# Patient Record
Sex: FEMALE | Race: WHITE | Employment: PART TIME | URBAN - METROPOLITAN AREA
[De-identification: names, ages, dates, MRNs, and addresses within clinical notes are randomized per-mention and may not be internally consistent; named-entity substitution may affect disease eponyms.]

---

## 2017-01-09 PROBLEM — K21.9 GASTROESOPHAGEAL REFLUX DISEASE WITHOUT ESOPHAGITIS: Status: ACTIVE | Noted: 2017-01-09

## 2017-01-11 PROCEDURE — 80061 LIPID PANEL: CPT | Performed by: INTERNAL MEDICINE

## 2017-01-11 PROCEDURE — 80053 COMPREHEN METABOLIC PANEL: CPT | Performed by: INTERNAL MEDICINE

## 2017-01-11 PROCEDURE — 36415 COLL VENOUS BLD VENIPUNCTURE: CPT | Performed by: INTERNAL MEDICINE

## 2017-01-11 PROCEDURE — 82306 VITAMIN D 25 HYDROXY: CPT | Performed by: INTERNAL MEDICINE

## 2018-01-10 PROBLEM — Z13.29 SCREENING FOR THYROID DISORDER: Status: ACTIVE | Noted: 2018-01-10

## 2018-01-10 PROBLEM — Z13.29 SCREENING FOR ENDOCRINE DISORDER: Status: ACTIVE | Noted: 2018-01-10

## 2018-12-19 PROCEDURE — 81003 URINALYSIS AUTO W/O SCOPE: CPT | Performed by: PHYSICIAN ASSISTANT

## 2018-12-31 PROCEDURE — 81003 URINALYSIS AUTO W/O SCOPE: CPT | Performed by: PHYSICIAN ASSISTANT

## 2019-01-16 PROCEDURE — 87624 HPV HI-RISK TYP POOLED RSLT: CPT | Performed by: INTERNAL MEDICINE

## 2019-01-16 PROCEDURE — 88175 CYTOPATH C/V AUTO FLUID REDO: CPT | Performed by: INTERNAL MEDICINE

## 2019-01-18 PROCEDURE — 86803 HEPATITIS C AB TEST: CPT | Performed by: INTERNAL MEDICINE

## 2020-01-22 PROBLEM — Z13.29 SCREENING FOR THYROID DISORDER: Status: RESOLVED | Noted: 2018-01-10 | Resolved: 2020-01-22

## 2020-02-07 PROBLEM — M25.511 ACUTE PAIN OF RIGHT SHOULDER: Status: ACTIVE | Noted: 2020-02-07

## 2022-02-17 PROBLEM — E78.5 DYSLIPIDEMIA: Status: ACTIVE | Noted: 2022-02-17

## 2022-10-20 ENCOUNTER — HOSPITAL ENCOUNTER (OUTPATIENT)
Dept: GENERAL RADIOLOGY | Age: 62
Discharge: HOME | End: 2022-10-20
Attending: PHYSICIAN ASSISTANT
Payer: COMMERCIAL

## 2022-10-20 ENCOUNTER — OFFICE VISIT (OUTPATIENT)
Dept: INTERNAL MEDICINE CLINIC | Facility: CLINIC | Age: 62
End: 2022-10-20
Payer: COMMERCIAL

## 2022-10-20 VITALS
OXYGEN SATURATION: 97 % | WEIGHT: 132.63 LBS | HEIGHT: 60.63 IN | TEMPERATURE: 98 F | HEART RATE: 88 BPM | BODY MASS INDEX: 25.37 KG/M2 | SYSTOLIC BLOOD PRESSURE: 160 MMHG | RESPIRATION RATE: 18 BRPM | DIASTOLIC BLOOD PRESSURE: 78 MMHG

## 2022-10-20 DIAGNOSIS — E78.00 PURE HYPERCHOLESTEROLEMIA: ICD-10-CM

## 2022-10-20 DIAGNOSIS — M85.80 OSTEOPENIA, UNSPECIFIED LOCATION: ICD-10-CM

## 2022-10-20 DIAGNOSIS — Z13.29 SCREENING FOR THYROID DISORDER: ICD-10-CM

## 2022-10-20 DIAGNOSIS — M79.671 RIGHT FOOT PAIN: Primary | ICD-10-CM

## 2022-10-20 DIAGNOSIS — Z13.0 SCREENING, ANEMIA, DEFICIENCY, IRON: ICD-10-CM

## 2022-10-20 DIAGNOSIS — M79.671 RIGHT FOOT PAIN: ICD-10-CM

## 2022-10-20 DIAGNOSIS — I10 PRIMARY HYPERTENSION: ICD-10-CM

## 2022-10-20 PROCEDURE — 3077F SYST BP >= 140 MM HG: CPT | Performed by: PHYSICIAN ASSISTANT

## 2022-10-20 PROCEDURE — 3078F DIAST BP <80 MM HG: CPT | Performed by: PHYSICIAN ASSISTANT

## 2022-10-20 PROCEDURE — 99203 OFFICE O/P NEW LOW 30 MIN: CPT | Performed by: PHYSICIAN ASSISTANT

## 2022-10-20 PROCEDURE — 73630 X-RAY EXAM OF FOOT: CPT | Performed by: PHYSICIAN ASSISTANT

## 2022-10-20 PROCEDURE — 3008F BODY MASS INDEX DOCD: CPT | Performed by: PHYSICIAN ASSISTANT

## 2022-10-20 RX ORDER — LOSARTAN POTASSIUM 25 MG/1
25 TABLET ORAL DAILY
Qty: 30 TABLET | Refills: 1 | Status: SHIPPED | OUTPATIENT
Start: 2022-10-20

## 2022-10-27 ENCOUNTER — TELEPHONE (OUTPATIENT)
Dept: INTERNAL MEDICINE CLINIC | Facility: CLINIC | Age: 62
End: 2022-10-27

## 2022-10-27 NOTE — TELEPHONE ENCOUNTER
cpe   Future Appointments   Date Time Provider Lopez Quiñonez   3/13/2023  3:20 PM Mina Bennett., TIMOTHY [facility]      Orders to THE [facility]   aware must fast no call back required

## 2022-12-01 ENCOUNTER — OFFICE VISIT (OUTPATIENT)
Dept: INTERNAL MEDICINE CLINIC | Facility: CLINIC | Age: 62
End: 2022-12-01
Payer: COMMERCIAL

## 2022-12-01 VITALS
SYSTOLIC BLOOD PRESSURE: 132 MMHG | BODY MASS INDEX: 25.24 KG/M2 | WEIGHT: 132 LBS | HEIGHT: 60.63 IN | OXYGEN SATURATION: 98 % | RESPIRATION RATE: 16 BRPM | HEART RATE: 78 BPM | DIASTOLIC BLOOD PRESSURE: 70 MMHG

## 2022-12-01 DIAGNOSIS — I10 PRIMARY HYPERTENSION: ICD-10-CM

## 2022-12-01 DIAGNOSIS — M79.671 RIGHT FOOT PAIN: Primary | ICD-10-CM

## 2022-12-01 PROCEDURE — 3078F DIAST BP <80 MM HG: CPT | Performed by: PHYSICIAN ASSISTANT

## 2022-12-01 PROCEDURE — 3075F SYST BP GE 130 - 139MM HG: CPT | Performed by: PHYSICIAN ASSISTANT

## 2022-12-01 PROCEDURE — 99213 OFFICE O/P EST LOW 20 MIN: CPT | Performed by: PHYSICIAN ASSISTANT

## 2022-12-01 PROCEDURE — 3008F BODY MASS INDEX DOCD: CPT | Performed by: PHYSICIAN ASSISTANT

## 2022-12-01 RX ORDER — LOSARTAN POTASSIUM 25 MG/1
25 TABLET ORAL DAILY
Qty: 90 TABLET | Refills: 3 | Status: SHIPPED | OUTPATIENT
Start: 2022-12-01

## 2022-12-02 ENCOUNTER — LAB ENCOUNTER (OUTPATIENT)
Dept: LAB | Age: 62
End: 2022-12-02
Attending: PHYSICIAN ASSISTANT
Payer: COMMERCIAL

## 2022-12-02 ENCOUNTER — TELEPHONE (OUTPATIENT)
Dept: ORTHOPEDICS CLINIC | Facility: CLINIC | Age: 62
End: 2022-12-02

## 2022-12-02 DIAGNOSIS — M79.671 RIGHT FOOT PAIN: Primary | ICD-10-CM

## 2022-12-02 DIAGNOSIS — I10 PRIMARY HYPERTENSION: ICD-10-CM

## 2022-12-02 LAB
ANION GAP SERPL CALC-SCNC: 5 MMOL/L (ref 0–18)
BUN BLD-MCNC: 16 MG/DL (ref 7–18)
BUN/CREAT SERPL: 19.5 (ref 10–20)
CALCIUM BLD-MCNC: 9.7 MG/DL (ref 8.5–10.1)
CHLORIDE SERPL-SCNC: 106 MMOL/L (ref 98–112)
CO2 SERPL-SCNC: 27 MMOL/L (ref 21–32)
CREAT BLD-MCNC: 0.82 MG/DL
FASTING STATUS PATIENT QL REPORTED: NO
GFR SERPLBLD BASED ON 1.73 SQ M-ARVRAT: 81 ML/MIN/1.73M2 (ref 60–?)
GLUCOSE BLD-MCNC: 140 MG/DL (ref 70–99)
OSMOLALITY SERPL CALC.SUM OF ELEC: 289 MOSM/KG (ref 275–295)
POTASSIUM SERPL-SCNC: 5.1 MMOL/L (ref 3.5–5.1)
SODIUM SERPL-SCNC: 138 MMOL/L (ref 136–145)

## 2022-12-02 PROCEDURE — 80048 BASIC METABOLIC PNL TOTAL CA: CPT

## 2022-12-02 PROCEDURE — 36415 COLL VENOUS BLD VENIPUNCTURE: CPT

## 2023-01-11 ENCOUNTER — HOSPITAL ENCOUNTER (OUTPATIENT)
Dept: GENERAL RADIOLOGY | Age: 63
Discharge: HOME | End: 2023-01-11
Attending: PODIATRIST
Payer: COMMERCIAL

## 2023-01-11 ENCOUNTER — OFFICE VISIT (OUTPATIENT)
Dept: ORTHOPEDICS CLINIC | Facility: CLINIC | Age: 63
End: 2023-01-11
Payer: COMMERCIAL

## 2023-01-11 VITALS — HEIGHT: 60.63 IN | WEIGHT: 130 LBS | BODY MASS INDEX: 24.87 KG/M2

## 2023-01-11 DIAGNOSIS — M21.619 BUNION: ICD-10-CM

## 2023-01-11 DIAGNOSIS — M79.671 RIGHT FOOT PAIN: ICD-10-CM

## 2023-01-11 DIAGNOSIS — L90.9 FAT PAD ATROPHY OF FOOT: ICD-10-CM

## 2023-01-11 DIAGNOSIS — M79.671 RIGHT FOOT PAIN: Primary | ICD-10-CM

## 2023-01-11 PROCEDURE — 73630 X-RAY EXAM OF FOOT: CPT | Performed by: PODIATRIST

## 2023-01-11 PROCEDURE — 99203 OFFICE O/P NEW LOW 30 MIN: CPT | Performed by: PODIATRIST

## 2023-01-11 PROCEDURE — 3008F BODY MASS INDEX DOCD: CPT | Performed by: PODIATRIST

## 2023-03-04 ENCOUNTER — LABORATORY ENCOUNTER (OUTPATIENT)
Dept: LAB | Age: 63
End: 2023-03-04
Attending: PHYSICIAN ASSISTANT
Payer: COMMERCIAL

## 2023-03-04 DIAGNOSIS — Z13.29 SCREENING FOR THYROID DISORDER: ICD-10-CM

## 2023-03-04 LAB
ALBUMIN SERPL-MCNC: 3.8 G/DL (ref 3.4–5)
ALBUMIN/GLOB SERPL: 1.2 {RATIO} (ref 1–2)
ALP LIVER SERPL-CCNC: 76 U/L
ALT SERPL-CCNC: 31 U/L
ANION GAP SERPL CALC-SCNC: 5 MMOL/L (ref 0–18)
AST SERPL-CCNC: 28 U/L (ref 15–37)
BASOPHILS # BLD AUTO: 0.07 X10(3) UL (ref 0–0.2)
BASOPHILS NFR BLD AUTO: 1.4 %
BILIRUB SERPL-MCNC: 0.4 MG/DL (ref 0.1–2)
BUN BLD-MCNC: 13 MG/DL (ref 7–18)
CALCIUM BLD-MCNC: 9.2 MG/DL (ref 8.5–10.1)
CHLORIDE SERPL-SCNC: 108 MMOL/L (ref 98–112)
CHOLEST SERPL-MCNC: 161 MG/DL (ref ?–200)
CO2 SERPL-SCNC: 25 MMOL/L (ref 21–32)
CREAT BLD-MCNC: 0.66 MG/DL
EOSINOPHIL # BLD AUTO: 0.11 X10(3) UL (ref 0–0.7)
EOSINOPHIL NFR BLD AUTO: 2.2 %
ERYTHROCYTE [DISTWIDTH] IN BLOOD BY AUTOMATED COUNT: 11.7 %
FASTING PATIENT LIPID ANSWER: YES
FASTING STATUS PATIENT QL REPORTED: YES
GFR SERPLBLD BASED ON 1.73 SQ M-ARVRAT: 99 ML/MIN/1.73M2 (ref 60–?)
GLOBULIN PLAS-MCNC: 3.3 G/DL (ref 2.8–4.4)
GLUCOSE BLD-MCNC: 104 MG/DL (ref 70–99)
HCT VFR BLD AUTO: 44.1 %
HDLC SERPL-MCNC: 76 MG/DL (ref 40–59)
HGB BLD-MCNC: 14.9 G/DL
IMM GRANULOCYTES # BLD AUTO: 0.01 X10(3) UL (ref 0–1)
IMM GRANULOCYTES NFR BLD: 0.2 %
LDLC SERPL CALC-MCNC: 76 MG/DL (ref ?–100)
LYMPHOCYTES # BLD AUTO: 1.53 X10(3) UL (ref 1–4)
LYMPHOCYTES NFR BLD AUTO: 30.7 %
MCH RBC QN AUTO: 30.3 PG (ref 26–34)
MCHC RBC AUTO-ENTMCNC: 33.8 G/DL (ref 31–37)
MCV RBC AUTO: 89.6 FL
MONOCYTES # BLD AUTO: 0.54 X10(3) UL (ref 0.1–1)
MONOCYTES NFR BLD AUTO: 10.8 %
NEUTROPHILS # BLD AUTO: 2.72 X10 (3) UL (ref 1.5–7.7)
NEUTROPHILS # BLD AUTO: 2.72 X10(3) UL (ref 1.5–7.7)
NEUTROPHILS NFR BLD AUTO: 54.7 %
NONHDLC SERPL-MCNC: 85 MG/DL (ref ?–130)
OSMOLALITY SERPL CALC.SUM OF ELEC: 286 MOSM/KG (ref 275–295)
PLATELET # BLD AUTO: 218 10(3)UL (ref 150–450)
POTASSIUM SERPL-SCNC: 4.9 MMOL/L (ref 3.5–5.1)
PROT SERPL-MCNC: 7.1 G/DL (ref 6.4–8.2)
RBC # BLD AUTO: 4.92 X10(6)UL
SODIUM SERPL-SCNC: 138 MMOL/L (ref 136–145)
TRIGL SERPL-MCNC: 38 MG/DL (ref 30–149)
TSI SER-ACNC: 1.19 MIU/ML (ref 0.36–3.74)
VLDLC SERPL CALC-MCNC: 6 MG/DL (ref 0–30)
WBC # BLD AUTO: 5 X10(3) UL (ref 4–11)

## 2023-03-04 PROCEDURE — 80061 LIPID PANEL: CPT | Performed by: PHYSICIAN ASSISTANT

## 2023-03-04 PROCEDURE — 80050 GENERAL HEALTH PANEL: CPT | Performed by: PHYSICIAN ASSISTANT

## 2023-03-13 ENCOUNTER — OFFICE VISIT (OUTPATIENT)
Dept: INTERNAL MEDICINE CLINIC | Facility: CLINIC | Age: 63
End: 2023-03-13
Payer: COMMERCIAL

## 2023-03-13 ENCOUNTER — TELEPHONE (OUTPATIENT)
Dept: INTERNAL MEDICINE CLINIC | Facility: CLINIC | Age: 63
End: 2023-03-13

## 2023-03-13 VITALS
HEIGHT: 60.75 IN | RESPIRATION RATE: 16 BRPM | SYSTOLIC BLOOD PRESSURE: 130 MMHG | DIASTOLIC BLOOD PRESSURE: 72 MMHG | WEIGHT: 130.38 LBS | TEMPERATURE: 97 F | HEART RATE: 88 BPM | BODY MASS INDEX: 24.94 KG/M2

## 2023-03-13 DIAGNOSIS — J45.20 MILD INTERMITTENT ASTHMA WITHOUT COMPLICATION: ICD-10-CM

## 2023-03-13 DIAGNOSIS — E78.5 DYSLIPIDEMIA: ICD-10-CM

## 2023-03-13 DIAGNOSIS — K21.9 GASTROESOPHAGEAL REFLUX DISEASE WITHOUT ESOPHAGITIS: ICD-10-CM

## 2023-03-13 DIAGNOSIS — Z12.31 ENCOUNTER FOR SCREENING MAMMOGRAM FOR MALIGNANT NEOPLASM OF BREAST: ICD-10-CM

## 2023-03-13 DIAGNOSIS — M85.80 OSTEOPENIA, UNSPECIFIED LOCATION: ICD-10-CM

## 2023-03-13 DIAGNOSIS — I10 PRIMARY HYPERTENSION: ICD-10-CM

## 2023-03-13 DIAGNOSIS — R73.01 IFG (IMPAIRED FASTING GLUCOSE): ICD-10-CM

## 2023-03-13 DIAGNOSIS — Z00.00 ROUTINE GENERAL MEDICAL EXAMINATION AT A HEALTH CARE FACILITY: Primary | ICD-10-CM

## 2023-03-13 DIAGNOSIS — Z12.83 SCREENING EXAM FOR SKIN CANCER: ICD-10-CM

## 2023-03-13 LAB
CARTRIDGE LOT#: 30 NUMERIC
HEMOGLOBIN A1C: 5.5 % (ref 4.3–5.6)

## 2023-03-13 RX ORDER — ALBUTEROL SULFATE 90 UG/1
2 AEROSOL, METERED RESPIRATORY (INHALATION) EVERY 6 HOURS PRN
Qty: 1 EACH | Refills: 1 | Status: SHIPPED | OUTPATIENT
Start: 2023-03-13 | End: 2024-03-12

## 2023-03-13 NOTE — TELEPHONE ENCOUNTER
Please place orders for   Pneumonia shot  Future Appointments   Date Time Provider Lopez Quiñonez   3/17/2023  2:30 PM EMG 35 NURSE [facility]

## 2023-03-17 ENCOUNTER — NURSE ONLY (OUTPATIENT)
Dept: INTERNAL MEDICINE CLINIC | Facility: CLINIC | Age: 63
End: 2023-03-17
Payer: COMMERCIAL

## 2023-03-20 RX ORDER — SOLIFENACIN SUCCINATE 5 MG/1
5 TABLET, FILM COATED ORAL DAILY
Qty: 90 TABLET | Refills: 3 | Status: SHIPPED | OUTPATIENT
Start: 2023-03-20

## 2023-04-04 ENCOUNTER — TELEPHONE (OUTPATIENT)
Dept: RADIATION ONCOLOGY | Facility: HOSPITAL | Age: 63
End: 2023-04-04

## 2023-04-04 NOTE — TELEPHONE ENCOUNTER
Unable to lvm to [facility] consult .  Ref dr Jessie Mims

## 2023-04-05 ENCOUNTER — TELEPHONE (OUTPATIENT)
Dept: RADIATION ONCOLOGY | Facility: HOSPITAL | Age: 63
End: 2023-04-05

## 2023-04-06 ENCOUNTER — TELEPHONE (OUTPATIENT)
Dept: RADIATION ONCOLOGY | Facility: HOSPITAL | Age: 63
End: 2023-04-06

## 2023-10-02 ENCOUNTER — HOSPITAL ENCOUNTER (OUTPATIENT)
Dept: MAMMOGRAPHY | Age: 63
Discharge: HOME | End: 2023-10-02
Attending: PHYSICIAN ASSISTANT
Payer: COMMERCIAL

## 2023-10-02 DIAGNOSIS — Z12.31 ENCOUNTER FOR SCREENING MAMMOGRAM FOR MALIGNANT NEOPLASM OF BREAST: ICD-10-CM

## 2023-10-02 PROCEDURE — 77067 SCR MAMMO BI INCL CAD: CPT | Performed by: PHYSICIAN ASSISTANT

## 2023-10-02 PROCEDURE — 77063 BREAST TOMOSYNTHESIS BI: CPT | Performed by: PHYSICIAN ASSISTANT

## 2023-12-21 ENCOUNTER — TELEPHONE (OUTPATIENT)
Dept: INTERNAL MEDICINE CLINIC | Facility: CLINIC | Age: 63
End: 2023-12-21

## 2023-12-21 ENCOUNTER — OFFICE VISIT (OUTPATIENT)
Dept: INTERNAL MEDICINE CLINIC | Facility: CLINIC | Age: 63
End: 2023-12-21
Payer: COMMERCIAL

## 2023-12-21 VITALS
HEART RATE: 100 BPM | BODY MASS INDEX: 25.63 KG/M2 | SYSTOLIC BLOOD PRESSURE: 120 MMHG | TEMPERATURE: 97 F | WEIGHT: 134 LBS | RESPIRATION RATE: 21 BRPM | HEIGHT: 60.75 IN | OXYGEN SATURATION: 100 % | DIASTOLIC BLOOD PRESSURE: 70 MMHG

## 2023-12-21 DIAGNOSIS — J01.90 ACUTE NON-RECURRENT SINUSITIS, UNSPECIFIED LOCATION: Primary | ICD-10-CM

## 2023-12-21 DIAGNOSIS — R09.82 PND (POST-NASAL DRIP): ICD-10-CM

## 2023-12-21 DIAGNOSIS — R05.9 COUGH, UNSPECIFIED TYPE: ICD-10-CM

## 2023-12-21 DIAGNOSIS — J02.9 SORE THROAT: ICD-10-CM

## 2023-12-21 LAB
CONTROL LINE PRESENT WITH A CLEAR BACKGROUND (YES/NO): YES YES/NO
KIT LOT #: NORMAL NUMERIC
STREP GRP A CUL-SCR: NEGATIVE

## 2023-12-21 PROCEDURE — 3078F DIAST BP <80 MM HG: CPT | Performed by: FAMILY MEDICINE

## 2023-12-21 PROCEDURE — 3074F SYST BP LT 130 MM HG: CPT | Performed by: FAMILY MEDICINE

## 2023-12-21 PROCEDURE — 99213 OFFICE O/P EST LOW 20 MIN: CPT | Performed by: FAMILY MEDICINE

## 2023-12-21 PROCEDURE — 87880 STREP A ASSAY W/OPTIC: CPT | Performed by: FAMILY MEDICINE

## 2023-12-21 PROCEDURE — 87637 SARSCOV2&INF A&B&RSV AMP PRB: CPT | Performed by: FAMILY MEDICINE

## 2023-12-21 PROCEDURE — 3008F BODY MASS INDEX DOCD: CPT | Performed by: FAMILY MEDICINE

## 2023-12-21 RX ORDER — BENZONATATE 100 MG/1
100 CAPSULE ORAL 3 TIMES DAILY PRN
Qty: 30 CAPSULE | Refills: 0 | Status: SHIPPED | OUTPATIENT
Start: 2023-12-21

## 2023-12-21 RX ORDER — AMOXICILLIN AND CLAVULANATE POTASSIUM 875; 125 MG/1; MG/1
1 TABLET, FILM COATED ORAL 2 TIMES DAILY
Qty: 14 TABLET | Refills: 0 | Status: SHIPPED | OUTPATIENT
Start: 2023-12-21 | End: 2023-12-28

## 2023-12-21 NOTE — TELEPHONE ENCOUNTER
Neg covid today. Scheduled below.     Future Appointments   Date Time Provider Lopez Quiñonez   12/21/2023  5:20 PM Kee Aburto MD [facility]   3/18/2024  9:00 AM Jagruti Ramirez MD [facility]

## 2023-12-21 NOTE — TELEPHONE ENCOUNTER
Patient states she started getting sick on Saturday; chills,no fever,body aches,sore throat,headaches. Patient was a little better but then yesterday she really felt bad; post nasal drip,congestion,headache,left side of throat hurts. She feels like it's more sinus.

## 2023-12-22 LAB
FLUAV + FLUBV RNA SPEC NAA+PROBE: NOT DETECTED
FLUAV + FLUBV RNA SPEC NAA+PROBE: NOT DETECTED
RSV RNA SPEC NAA+PROBE: NOT DETECTED
SARS-COV-2 RNA RESP QL NAA+PROBE: NOT DETECTED

## 2024-01-31 ENCOUNTER — TELEPHONE (OUTPATIENT)
Dept: INTERNAL MEDICINE CLINIC | Facility: CLINIC | Age: 64
End: 2024-01-31

## 2024-01-31 ENCOUNTER — OFFICE VISIT (OUTPATIENT)
Dept: FAMILY MEDICINE CLINIC | Facility: CLINIC | Age: 64
End: 2024-01-31
Payer: COMMERCIAL

## 2024-01-31 VITALS
HEART RATE: 82 BPM | DIASTOLIC BLOOD PRESSURE: 65 MMHG | HEIGHT: 61 IN | RESPIRATION RATE: 18 BRPM | BODY MASS INDEX: 25.49 KG/M2 | SYSTOLIC BLOOD PRESSURE: 133 MMHG | WEIGHT: 135 LBS | TEMPERATURE: 98 F | OXYGEN SATURATION: 96 %

## 2024-01-31 DIAGNOSIS — J02.9 SORE THROAT: ICD-10-CM

## 2024-01-31 DIAGNOSIS — R68.89 FLU-LIKE SYMPTOMS: Primary | ICD-10-CM

## 2024-01-31 DIAGNOSIS — J01.00 ACUTE NON-RECURRENT MAXILLARY SINUSITIS: ICD-10-CM

## 2024-01-31 LAB
CONTROL LINE PRESENT WITH A CLEAR BACKGROUND (YES/NO): YES YES/NO
KIT LOT #: NORMAL NUMERIC

## 2024-01-31 PROCEDURE — 3008F BODY MASS INDEX DOCD: CPT | Performed by: NURSE PRACTITIONER

## 2024-01-31 PROCEDURE — 87637 SARSCOV2&INF A&B&RSV AMP PRB: CPT | Performed by: NURSE PRACTITIONER

## 2024-01-31 PROCEDURE — 3078F DIAST BP <80 MM HG: CPT | Performed by: NURSE PRACTITIONER

## 2024-01-31 PROCEDURE — 87880 STREP A ASSAY W/OPTIC: CPT | Performed by: NURSE PRACTITIONER

## 2024-01-31 PROCEDURE — 99213 OFFICE O/P EST LOW 20 MIN: CPT | Performed by: NURSE PRACTITIONER

## 2024-01-31 PROCEDURE — 3075F SYST BP GE 130 - 139MM HG: CPT | Performed by: NURSE PRACTITIONER

## 2024-01-31 RX ORDER — DOXYCYCLINE HYCLATE 100 MG
100 TABLET ORAL 2 TIMES DAILY
Qty: 14 TABLET | Refills: 0 | Status: SHIPPED | OUTPATIENT
Start: 2024-02-04 | End: 2024-02-11

## 2024-01-31 NOTE — PATIENT INSTRUCTIONS
Tylenol and Motrin as needed  Rest, push fluids  Mucinex DM over the counter for nasal congestion/cough  START daily antihistamine (Zyrtec, Claritin, Allegra) and choose one of those. Take daily for 1-2 weeks to help with any post nasal drainage/sore throat  Continue Nasacort daily  If symptoms persist for 10-14 days without any relief, may go ahead and fill post-dated Rx for antibiotics (Doxycycline). If you start taking them, make sure you finish the entire course  We will notify you of nasal swab results when received

## 2024-01-31 NOTE — TELEPHONE ENCOUNTER
Last seen by YOLETTE for acute sinusitis 12/21. She states similar symptoms at this time since Friday. Two covid tests and negative. Scheduled tomorrow at 4 with CS. She was provided walk in clinic locations if needing to be seen today. Patient states yesterday temperature of . Using otc medications for symptoms. States congestion, cough, facial fullness, low grade temperature. She is not mychrt active so cannot add to wait list. She will call and cancel if she pursues wic today.

## 2024-01-31 NOTE — TELEPHONE ENCOUNTER
Pt had sinus inf before Christmas-never went away-last Friday tired/sore throat-this week fever-last night head congestion-wants appt-nothing but triage spots left today

## 2024-01-31 NOTE — PROGRESS NOTES
Subjective:   Patient ID: Ashanti Lion is a 63 year old female.    Patient is a 63 year old female who presents today with complaints of congestion, cough, post nasal drip, facial fullness (maxillary) and fatigue x 1 week. 2 days ago developed fevers (TMAX 100F), headaches, sore throat. Denies SOB, wheezing, ear pain, n/v/d, abdominal pain or runny nose. Tolerating PO well at home. Attempted treatment prior to arrival = sinus rinse, Nasacort, steam, albuterol and nasal strips for nasal congestion. No ill contacts in the home. Works as a  with multiple ill exposures. Home COVID tests negative (x2). Of note patient treated for a sinus infection 12/21/23 with Augmentin. She does report full resolution of symptoms at that time.        History/Other:   Review of Systems   Constitutional:  Positive for fatigue and fever. Negative for appetite change.   HENT:  Positive for congestion, postnasal drip, sinus pressure and sore throat. Negative for ear pain and rhinorrhea.    Respiratory:  Positive for cough. Negative for shortness of breath and wheezing.    Gastrointestinal:  Negative for abdominal pain, diarrhea, nausea and vomiting.   Neurological:  Positive for headaches.     Current Outpatient Medications   Medication Sig Dispense Refill    [START ON 2/4/2024] Doxycycline Hyclate 100 MG Oral Tab Take 1 tablet (100 mg total) by mouth 2 (two) times daily for 7 days. 14 tablet 0    losartan 25 MG Oral Tab Take 1 tablet (25 mg total) by mouth daily. 90 tablet 3    atorvastatin 10 MG Oral Tab Take 1 tablet (10 mg total) by mouth daily. 90 tablet 3    Solifenacin Succinate 5 MG Oral Tab Take 1 tablet (5 mg total) by mouth daily. 90 tablet 3    albuterol (PROAIR HFA) 108 (90 Base) MCG/ACT Inhalation Aero Soln Inhale 2 puffs into the lungs every 6 (six) hours as needed for Wheezing. 1 each 1    Multiple Vitamins-Minerals (EYE VITAMINS OR) Take 1 tablet by mouth daily.      Famotidine (PEPCID AC OR) Take by  mouth daily as needed.      Triamcinolone Acetonide (NASACORT ALLERGY 24HR NA) by Nasal route daily.        cetirizine (ZYRTEC) 10 MG Oral Tab Take 1 tablet (10 mg total) by mouth daily.      Cholecalciferol (VITAMIN D) 2000 UNIT Oral Cap 1 CAPSULE DAILY      Calcium Carbonate (CALCIUM 600 OR) qd       Allergies:  Allergies   Allergen Reactions    Mold      /65 (BP Location: Left arm, Patient Position: Sitting, Cuff Size: adult)   Pulse 82   Temp 97.5 °F (36.4 °C) (Temporal)   Resp 18   Ht 5' 1\" (1.549 m)   Wt 135 lb (61.2 kg)   LMP 08/08/2015   SpO2 96%   BMI 25.51 kg/m²     Objective:   Physical Exam  Vitals reviewed.   Constitutional:       General: She is awake. She is not in acute distress.     Appearance: Normal appearance. She is well-developed and well-groomed. She is not ill-appearing, toxic-appearing or diaphoretic.   HENT:      Head: Normocephalic and atraumatic.      Right Ear: Tympanic membrane, ear canal and external ear normal.      Left Ear: Tympanic membrane, ear canal and external ear normal.      Nose:      Right Sinus: Maxillary sinus tenderness present.      Left Sinus: Maxillary sinus tenderness present.      Mouth/Throat:      Lips: Pink.      Mouth: Mucous membranes are moist. No oral lesions.      Pharynx: Oropharynx is clear. Uvula midline. Posterior oropharyngeal erythema present.   Cardiovascular:      Rate and Rhythm: Normal rate and regular rhythm.   Pulmonary:      Effort: Pulmonary effort is normal. No respiratory distress.      Breath sounds: Normal breath sounds and air entry. No decreased breath sounds, wheezing, rhonchi or rales.   Lymphadenopathy:      Cervical: No cervical adenopathy.   Skin:     General: Skin is warm and dry.   Neurological:      Mental Status: She is alert and oriented to person, place, and time.   Psychiatric:         Behavior: Behavior is cooperative.         Assessment & Plan:   1. Flu-like symptoms    2. Sore throat    3. Acute non-recurrent  maxillary sinusitis        Orders Placed This Encounter   Procedures    Strep A Assay W/Optic    SARS-CoV-2/Flu A and B/RSV by PCR (Nasrin)     Results for orders placed or performed in visit on 01/31/24   Strep A Assay W/Optic    Collection Time: 01/31/24 10:57 AM   Result Value Ref Range    Strep Grp A Screen neg Negative    Control Line Present with a clear background (yes/no) yes Yes/No    Kit Lot # [id number] Numeric    Kit Expiration Date 04-22-25 Date       Meds This Visit:  Requested Prescriptions     Signed Prescriptions Disp Refills    Doxycycline Hyclate 100 MG Oral Tab 14 tablet 0     Sig: Take 1 tablet (100 mg total) by mouth 2 (two) times daily for 7 days.     Reviewed POC test results with patient.  Quad screen collected and sent. Will notify of results.  Reassuring physical exam findings. Vitals WNL. No sign of bacterial etiology, RDS or dehydration at this time.  Discussed expected course of viral illness. Post dated Rx given to patient should URI/sinus symptoms persist x 10-14 days without improvement despite OTC remedies.  Patient on Augmentin 12/2023. Will print Rx for Doxycycline today.  Supportive care and return to care measures reviewed.  Patient v/u and is comfortable with this plan.    Patient Instructions   Tylenol and Motrin as needed  Rest, push fluids  Mucinex DM over the counter for nasal congestion/cough  START daily antihistamine (Zyrtec, Claritin, Allegra) and choose one of those. Take daily for 1-2 weeks to help with any post nasal drainage/sore throat  Continue Nasacort daily  If symptoms persist for 10-14 days without any relief, may go ahead and fill post-dated Rx for antibiotics (Doxycycline). If you start taking them, make sure you finish the entire course  We will notify you of nasal swab results when received

## 2024-01-31 NOTE — TELEPHONE ENCOUNTER
Future Appointments   Date Time Provider Department Center   3/18/2024  9:00 AM Ana María Umanzor MD [facility]     Pt seen at [facility] today.

## 2024-02-01 LAB
FLUAV + FLUBV RNA SPEC NAA+PROBE: NOT DETECTED
FLUAV + FLUBV RNA SPEC NAA+PROBE: NOT DETECTED
RSV RNA SPEC NAA+PROBE: NOT DETECTED
SARS-COV-2 RNA RESP QL NAA+PROBE: DETECTED

## 2024-03-05 RX ORDER — SOLIFENACIN SUCCINATE 5 MG/1
5 TABLET, FILM COATED ORAL DAILY
Qty: 90 TABLET | Refills: 3 | Status: SHIPPED | OUTPATIENT
Start: 2024-03-05

## 2024-03-05 NOTE — TELEPHONE ENCOUNTER
Last VISIT:03/13/2023 TIMOTHY ENG    Last CPE: 03/13/2023    Last REFILL:03/20/2023   Medication Quantity Refills Start End   Solifenacin Succinate 5 MG Oral Tab 90 tablet 3         Last LABS:03/04/2023    Future Appointments   Date Time Provider Department Center   3/11/2024  [facility] [address]   3/18/2024  9:00 AM Ana María Umanzor MD [facility]         Per PROTOCOL? Passed Protocol

## 2024-03-08 ENCOUNTER — TELEPHONE (OUTPATIENT)
Dept: INTERNAL MEDICINE CLINIC | Facility: CLINIC | Age: 64
End: 2024-03-08

## 2024-03-08 DIAGNOSIS — Z13.220 SCREENING FOR LIPID DISORDERS: ICD-10-CM

## 2024-03-08 DIAGNOSIS — Z00.00 ROUTINE GENERAL MEDICAL EXAMINATION AT A HEALTH CARE FACILITY: Primary | ICD-10-CM

## 2024-03-08 DIAGNOSIS — Z13.0 SCREENING FOR DISORDER OF BLOOD AND BLOOD-FORMING ORGANS: ICD-10-CM

## 2024-03-08 DIAGNOSIS — Z13.228 SCREENING FOR METABOLIC DISORDER: ICD-10-CM

## 2024-03-08 DIAGNOSIS — Z13.29 SCREENING FOR THYROID DISORDER: ICD-10-CM

## 2024-03-08 NOTE — TELEPHONE ENCOUNTER
Pt will get labs on Monday morning--requesting labs to be placed asap. Pt preferred pharmacy is [facility]    Future Appointments   Date Time Provider Department Center   3/18/2024  9:00 AM Ana María Umanzor MD [facility]

## 2024-03-11 ENCOUNTER — LABORATORY ENCOUNTER (OUTPATIENT)
Dept: LAB | Age: 64
End: 2024-03-11
Attending: INTERNAL MEDICINE
Payer: COMMERCIAL

## 2024-03-11 DIAGNOSIS — Z13.0 SCREENING FOR DISORDER OF BLOOD AND BLOOD-FORMING ORGANS: ICD-10-CM

## 2024-03-11 DIAGNOSIS — Z00.00 ROUTINE GENERAL MEDICAL EXAMINATION AT A HEALTH CARE FACILITY: ICD-10-CM

## 2024-03-11 DIAGNOSIS — Z13.220 SCREENING FOR LIPID DISORDERS: ICD-10-CM

## 2024-03-11 DIAGNOSIS — Z13.29 SCREENING FOR THYROID DISORDER: ICD-10-CM

## 2024-03-11 DIAGNOSIS — Z13.228 SCREENING FOR METABOLIC DISORDER: ICD-10-CM

## 2024-03-11 LAB
ALBUMIN SERPL-MCNC: 3.9 G/DL (ref 3.4–5)
ALBUMIN/GLOB SERPL: 1.1 {RATIO} (ref 1–2)
ALP LIVER SERPL-CCNC: 69 U/L
ALT SERPL-CCNC: 22 U/L
ANION GAP SERPL CALC-SCNC: 9 MMOL/L (ref 0–18)
AST SERPL-CCNC: 17 U/L (ref 15–37)
BASOPHILS # BLD AUTO: 0.08 X10(3) UL (ref 0–0.2)
BASOPHILS NFR BLD AUTO: 1.4 %
BILIRUB SERPL-MCNC: 0.5 MG/DL (ref 0.1–2)
BUN BLD-MCNC: 16 MG/DL (ref 9–23)
CALCIUM BLD-MCNC: 9.2 MG/DL (ref 8.5–10.1)
CHLORIDE SERPL-SCNC: 104 MMOL/L (ref 98–112)
CHOLEST SERPL-MCNC: 177 MG/DL (ref ?–200)
CO2 SERPL-SCNC: 25 MMOL/L (ref 21–32)
CREAT BLD-MCNC: 0.82 MG/DL
EGFRCR SERPLBLD CKD-EPI 2021: 80 ML/MIN/1.73M2 (ref 60–?)
EOSINOPHIL # BLD AUTO: 0.12 X10(3) UL (ref 0–0.7)
EOSINOPHIL NFR BLD AUTO: 2.1 %
ERYTHROCYTE [DISTWIDTH] IN BLOOD BY AUTOMATED COUNT: 12.4 %
FASTING PATIENT LIPID ANSWER: YES
FASTING STATUS PATIENT QL REPORTED: YES
GLOBULIN PLAS-MCNC: 3.4 G/DL (ref 2.8–4.4)
GLUCOSE BLD-MCNC: 98 MG/DL (ref 70–99)
HCT VFR BLD AUTO: 44.9 %
HDLC SERPL-MCNC: 60 MG/DL (ref 40–59)
HGB BLD-MCNC: 14.4 G/DL
IMM GRANULOCYTES # BLD AUTO: 0.01 X10(3) UL (ref 0–1)
IMM GRANULOCYTES NFR BLD: 0.2 %
LDLC SERPL CALC-MCNC: 104 MG/DL (ref ?–100)
LYMPHOCYTES # BLD AUTO: 1.72 X10(3) UL (ref 1–4)
LYMPHOCYTES NFR BLD AUTO: 30.5 %
MCH RBC QN AUTO: 28.8 PG (ref 26–34)
MCHC RBC AUTO-ENTMCNC: 32.1 G/DL (ref 31–37)
MCV RBC AUTO: 89.8 FL
MONOCYTES # BLD AUTO: 0.7 X10(3) UL (ref 0.1–1)
MONOCYTES NFR BLD AUTO: 12.4 %
NEUTROPHILS # BLD AUTO: 3.01 X10 (3) UL (ref 1.5–7.7)
NEUTROPHILS # BLD AUTO: 3.01 X10(3) UL (ref 1.5–7.7)
NEUTROPHILS NFR BLD AUTO: 53.4 %
NONHDLC SERPL-MCNC: 117 MG/DL (ref ?–130)
OSMOLALITY SERPL CALC.SUM OF ELEC: 287 MOSM/KG (ref 275–295)
PLATELET # BLD AUTO: 229 10(3)UL (ref 150–450)
POTASSIUM SERPL-SCNC: 4.5 MMOL/L (ref 3.5–5.1)
PROT SERPL-MCNC: 7.3 G/DL (ref 6.4–8.2)
RBC # BLD AUTO: 5 X10(6)UL
SODIUM SERPL-SCNC: 138 MMOL/L (ref 136–145)
TRIGL SERPL-MCNC: 66 MG/DL (ref 30–149)
TSI SER-ACNC: 2.04 MIU/ML (ref 0.36–3.74)
VLDLC SERPL CALC-MCNC: 11 MG/DL (ref 0–30)
WBC # BLD AUTO: 5.6 X10(3) UL (ref 4–11)

## 2024-03-11 PROCEDURE — 80050 GENERAL HEALTH PANEL: CPT | Performed by: INTERNAL MEDICINE

## 2024-03-11 PROCEDURE — 80061 LIPID PANEL: CPT | Performed by: INTERNAL MEDICINE

## 2024-03-18 ENCOUNTER — OFFICE VISIT (OUTPATIENT)
Dept: INTERNAL MEDICINE CLINIC | Facility: CLINIC | Age: 64
End: 2024-03-18
Payer: COMMERCIAL

## 2024-03-18 VITALS
TEMPERATURE: 98 F | HEIGHT: 61 IN | RESPIRATION RATE: 16 BRPM | DIASTOLIC BLOOD PRESSURE: 78 MMHG | SYSTOLIC BLOOD PRESSURE: 132 MMHG | OXYGEN SATURATION: 98 % | WEIGHT: 132 LBS | BODY MASS INDEX: 24.92 KG/M2 | HEART RATE: 89 BPM

## 2024-03-18 DIAGNOSIS — Z12.4 SCREENING FOR CERVICAL CANCER: ICD-10-CM

## 2024-03-18 DIAGNOSIS — Z12.31 ENCOUNTER FOR SCREENING MAMMOGRAM FOR MALIGNANT NEOPLASM OF BREAST: ICD-10-CM

## 2024-03-18 DIAGNOSIS — I10 ESSENTIAL (PRIMARY) HYPERTENSION: ICD-10-CM

## 2024-03-18 DIAGNOSIS — E78.5 DYSLIPIDEMIA: ICD-10-CM

## 2024-03-18 DIAGNOSIS — Z11.51 SCREENING FOR HUMAN PAPILLOMAVIRUS (HPV): ICD-10-CM

## 2024-03-18 DIAGNOSIS — J45.20 MILD INTERMITTENT ASTHMA WITHOUT COMPLICATION (HCC): ICD-10-CM

## 2024-03-18 DIAGNOSIS — Z00.00 ROUTINE GENERAL MEDICAL EXAMINATION AT A HEALTH CARE FACILITY: Primary | ICD-10-CM

## 2024-03-18 DIAGNOSIS — N32.81 OAB (OVERACTIVE BLADDER): ICD-10-CM

## 2024-03-18 PROCEDURE — 3008F BODY MASS INDEX DOCD: CPT | Performed by: INTERNAL MEDICINE

## 2024-03-18 PROCEDURE — 3075F SYST BP GE 130 - 139MM HG: CPT | Performed by: INTERNAL MEDICINE

## 2024-03-18 PROCEDURE — 3078F DIAST BP <80 MM HG: CPT | Performed by: INTERNAL MEDICINE

## 2024-03-18 PROCEDURE — 87624 HPV HI-RISK TYP POOLED RSLT: CPT | Performed by: INTERNAL MEDICINE

## 2024-03-18 PROCEDURE — 99396 PREV VISIT EST AGE 40-64: CPT | Performed by: INTERNAL MEDICINE

## 2024-03-18 RX ORDER — LOSARTAN POTASSIUM 25 MG/1
25 TABLET ORAL DAILY
Qty: 90 TABLET | Refills: 3 | Status: SHIPPED | OUTPATIENT
Start: 2024-03-18

## 2024-03-18 RX ORDER — ALBUTEROL SULFATE 90 UG/1
2 AEROSOL, METERED RESPIRATORY (INHALATION) EVERY 6 HOURS PRN
Qty: 3 EACH | Refills: 0 | Status: SHIPPED | OUTPATIENT
Start: 2024-03-18 | End: 2025-03-18

## 2024-03-18 NOTE — PROGRESS NOTES
Chief Complaint   Patient presents with    Physical     TA RM3       HPI:    Patient is  with grown son. She is a , no acute complaints  Asthma- ACT 25, rarely uses albuterol prn  HTN and HL controlled on medication, no cardiac complaints. Stays active with walking  Postmenopausal, no vaginal or breast complaints. Hot flashes have gotten better  No h/o abnormal pap    Review of Systems   Constitutional: Negative for fever  HENT: Negative for hearing loss, congestion  Eyes: Negative for pain and visual disturbance.   Respiratory: Negative for cough, chest tightness  Cardiovascular: Negative for chest pain  Gastrointestinal: Negative for nausea, vomiting  Genitourinary: Negative for dysuria, hematuria   Skin: Negative for color change and rash.   Neurological: Negative for dizziness, syncope  Hematological: Negative for adenopathy.   Psychiatric/Behavioral: No depression.    Patient Active Problem List   Diagnosis    Asthma (HCC)    Bilateral bunions    Gastroesophageal reflux disease without esophagitis    Acute pain of right shoulder    Dyslipidemia    Right foot pain    Bunion    Essential (primary) hypertension    OAB (overactive bladder)       Past Medical History:   Diagnosis Date    ALLERGIC RHINITIS     Asthma     Essential (primary) hypertension 3/18/2024    Frequent urination     High cholesterol     Migraines     very seldom    Reflux      Past Surgical History:   Procedure Laterality Date    APPENDECTOMY  1998    APPENDECTOMY      COLONOSCOPY  2011    polyp(repeat 5 years)    COLONOSCOPY      EGD      EXCISION TURBINATE,SUBMUCOUS N/A 05/13/2014    Procedure: ENDOSCOPY,  SINUS SEPTOPLASTY,ANTROSTOMIES;  Surgeon: Naida Estes MD;  [place] [facility] [place] [facility] [facility] BIOPSY STEREO NODULE 2 SITE BILAT (CPT=19081/73101)  2006    benign    NASAL SCOPY,OPEN MAXILL SINUS N/A 05/13/2014    Procedure: ENDOSCOPY,  SINUS SEPTOPLASTY,ANTROSTOMIES;  Surgeon: Naida Estes MD;  [place]  [facility] [place]    NASAL SCOPY,REMV TOTL ETHMOID N/A 05/13/2014    Procedure: ENDOSCOPY,  SINUS SEPTOPLASTY,ANTROSTOMIES;  Surgeon: Nadia Estes MD;  Location: [facility], [facility]    NEEDLE BIOPSY LEFT Left 2008    benign needle biopsy    OTHER SURGICAL HISTORY      STEREOTACTIC BREAST BIOPSY  2006    left breast - benign- followed by Dr Pal     Family History   Problem Relation Age of Onset    Heart Disease Father     Heart Attack Father     Cancer Mother         bladder    Heart Attack Mother     Other (emphysema) Mother     Other (osteoporosis) Mother     Diabetes Brother     High Blood Pressure Sister      Social History     Socioeconomic History    Marital status:     Number of children: 1   Occupational History    Occupation: volunteer     Comment: school   Tobacco Use    Smoking status: Never    Smokeless tobacco: Never   Vaping Use    Vaping Use: Never used   Substance and Sexual Activity    Alcohol use: No     Alcohol/week: 0.0 standard drinks of alcohol    Drug use: No    Sexual activity: Yes     Partners: Male   Other Topics Concern    Caffeine Concern Yes     Comment: 1 cup of coffee in the mornings    Exercise Yes       Current Outpatient Medications   Medication Sig Dispense Refill    albuterol (PROAIR HFA) 108 (90 Base) MCG/ACT Inhalation Aero Soln Inhale 2 puffs into the lungs every 6 (six) hours as needed for Wheezing. 3 each 0    losartan 25 MG Oral Tab Take 1 tablet (25 mg total) by mouth daily. 90 tablet 3    SOLIFENACIN SUCCINATE 5 MG Oral Tab TAKE 1 TABLET DAILY 90 tablet 3    atorvastatin 10 MG Oral Tab Take 1 tablet (10 mg total) by mouth daily. 90 tablet 3    Multiple Vitamins-Minerals (EYE VITAMINS OR) Take 1 tablet by mouth daily.      Famotidine (PEPCID AC OR) Take by mouth daily as needed.      Triamcinolone Acetonide (NASACORT ALLERGY 24HR NA) by Nasal route daily.        cetirizine (ZYRTEC) 10 MG Oral Tab Take 1 tablet (10 mg total) by mouth daily.       Cholecalciferol (VITAMIN D) 2000 UNIT Oral Cap 1 CAPSULE DAILY      Calcium Carbonate (CALCIUM 600 OR) qd         Allergies  Allergies   Allergen Reactions    Mold        Health Maintenance  Immunizations:  Immunization History   Administered Date(s) Administered    >= 3 Yrs, Influenza Vaccine,(63739),[facility] 10/18/2008, 09/26/2009, 11/14/2011, 10/04/2012    Covid-19 Vaccine Pfizer 30 mcg/0.3 ml 03/11/2021, 04/08/2021, 10/22/2021    Covid-19 Vaccine Pfizer Amandeep-Sucrose 30 mcg/0.3 ml 08/04/2022    FLU VAC QIV SPLIT 3 YRS AND OLDER (88774) 11/12/2014, 09/02/2016    FLUZONE 6 months and older PFS 0.5 ml (14338) 09/19/2018, 09/14/2019, 09/10/2020, 09/12/2021, 09/08/2023    Fluarix 6 Months And Older 0.5 ml prefilled syringe (83186) 09/14/2019    Flublok Quad Influenza Vaccine (19119) 09/23/2022    Fluvirin, 3 Years & >, Im 09/13/2017, 09/14/2017    Influenza 11/07/2007, 11/30/2010, 10/15/2013, 09/24/2015, 10/01/2015, 09/03/2016, 09/16/2020    Influenza Virus Vaccine, H1N1 11/19/2009    MMR 05/06/2015, 06/03/2015    Pfizer Covid-19 Vaccine 30mcg/0.3ml 12yrs+ ([id number]) 09/22/2023    Pneumococcal Conjugate PCV20 03/17/2023    Pneumovax 23 09/01/2004    RSV, bivalent, diluent reconstituted PF (Abrysvo) 08/25/2023    TD 09/01/2009    TDAP 12/04/2015    Tb Intradermal Test 05/13/2008    Zoster Vaccine Live (Zostavax) 09/17/2021    Zoster Vaccine Recombinant Adjuvanted (Shingrix) 09/17/2021, 12/03/2021         Physical Exam  /78   Pulse 89   Temp 97.6 °F (36.4 °C)   Resp 16   Ht 5' 1\" (1.549 m)   Wt 132 lb (59.9 kg)   LMP 08/08/2015   SpO2 98%   BMI 24.94 kg/m²   Constitutional: Oriented to person, place, and time. No distress.   HEENT:  Normocephalic and atraumatic. Hearing and tympanic membranes normal.   Eyes: Conjunctivae and EOM are normal. PERRLA. No scleral icterus.   Neck: Normal range of motion. Neck supple.   Cardiovascular: Normal rate, regular rhythm and intact distal pulses.    Pulmonary/Chest:  Effort normal and breath sounds normal.   Abdominal: Soft. Bowel sounds are normal. Non tender, ND  Neurological: alert and oriented, no focal deficits  Skin: Skin is warm and dry.   Psychiatric: Normal mood and affect.     A/P:    Encounter Diagnoses   Name     Encounter for screening mammogram for malignant neoplasm of breast     Screening for cervical cancer     Screening for human papillomavirus (HPV)     Routine general medical examination at a health care facility- pap with HPV done today, referred for mammogram, encouraged healthy diet and regular exercise     Mild intermittent asthma without complication (HCC)- controlled, ACT 25, reviewed and agree with ACT and AAP     Dyslipidemia- continue atorvastatin and heart healthy diet     Essential (primary) hypertension- controlled on losartan, CPM     OAB (overactive bladder)- controlled, CPM        Orders Placed This Encounter   Procedures    Hpv Dna  High Risk , Thin Prep Collect    ThinPrep PAP Smear       Meds & Refills for this Visit:  Requested Prescriptions     Signed Prescriptions Disp Refills    albuterol (PROAIR HFA) 108 (90 Base) MCG/ACT Inhalation Aero Soln 3 each 0     Sig: Inhale 2 puffs into the lungs every 6 (six) hours as needed for Wheezing.    losartan 25 MG Oral Tab 90 tablet 3     Sig: Take 1 tablet (25 mg total) by mouth daily.       Imaging & Consults:  [facility] ANUJA 2D+3D SCREENING BILAT (CPT=77067/00784)      Return if symptoms worsen or fail to improve.    There are no Patient Instructions on file for this visit.    All questions were answered and the patient understands the plan.

## 2024-03-19 LAB — HPV I/H RISK 1 DNA SPEC QL NAA+PROBE: NEGATIVE

## 2024-03-25 LAB
.: NORMAL
.: NORMAL

## 2024-04-01 ENCOUNTER — OFFICE VISIT (OUTPATIENT)
Dept: INTERNAL MEDICINE CLINIC | Facility: CLINIC | Age: 64
End: 2024-04-01
Payer: COMMERCIAL

## 2024-04-01 VITALS
DIASTOLIC BLOOD PRESSURE: 74 MMHG | OXYGEN SATURATION: 98 % | HEIGHT: 61 IN | RESPIRATION RATE: 18 BRPM | HEART RATE: 83 BPM | SYSTOLIC BLOOD PRESSURE: 130 MMHG | WEIGHT: 132.81 LBS | TEMPERATURE: 97 F | BODY MASS INDEX: 25.07 KG/M2

## 2024-04-01 DIAGNOSIS — R30.0 DYSURIA: Primary | ICD-10-CM

## 2024-04-01 LAB
APPEARANCE: CLEAR
BILIRUBIN: NEGATIVE
GLUCOSE (URINE DIPSTICK): NEGATIVE MG/DL
KETONES (URINE DIPSTICK): NEGATIVE MG/DL
MULTISTIX EXPIRATION DATE: ABNORMAL DATE
MULTISTIX LOT#: ABNORMAL NUMERIC
NITRITE, URINE: NEGATIVE
PH, URINE: 6 (ref 4.5–8)
PROTEIN (URINE DIPSTICK): NEGATIVE MG/DL
SPECIFIC GRAVITY: 1.02 (ref 1–1.03)
URINE-COLOR: CLEAR
UROBILINOGEN,SEMI-QN: 0.2 MG/DL (ref 0–1.9)

## 2024-04-01 PROCEDURE — 87086 URINE CULTURE/COLONY COUNT: CPT | Performed by: INTERNAL MEDICINE

## 2024-04-01 PROCEDURE — 87088 URINE BACTERIA CULTURE: CPT | Performed by: INTERNAL MEDICINE

## 2024-04-01 PROCEDURE — 87186 SC STD MICRODIL/AGAR DIL: CPT | Performed by: INTERNAL MEDICINE

## 2024-04-01 RX ORDER — NITROFURANTOIN 25; 75 MG/1; MG/1
100 CAPSULE ORAL 2 TIMES DAILY
Qty: 10 CAPSULE | Refills: 0 | Status: SHIPPED | OUTPATIENT
Start: 2024-04-01

## 2024-04-01 NOTE — PROGRESS NOTES
Ashnati Lion is a 63 year old female.    Chief Complaint   Patient presents with    UTI     Pt is here for possible, symptoms is burning and frequent urination       HPI:     Patient is c/o dysuria and frequency along with suprapubic pressure for last 3 days. She had 2 UTI in the past, last one 10 years ago. No hematuria/n/v/f/c  Not sexually active recently. No ABX allergies.       Patient Active Problem List   Diagnosis    Asthma (HCC)    Bilateral bunions    Gastroesophageal reflux disease without esophagitis    Acute pain of right shoulder    Dyslipidemia    Right foot pain    Bunion    Essential (primary) hypertension    OAB (overactive bladder)     Current Outpatient Medications   Medication Sig Dispense Refill    nitrofurantoin monohydrate macro 100 MG Oral Cap Take 1 capsule (100 mg total) by mouth 2 (two) times daily. 10 capsule 0    albuterol (PROAIR HFA) 108 (90 Base) MCG/ACT Inhalation Aero Soln Inhale 2 puffs into the lungs every 6 (six) hours as needed for Wheezing. 3 each 0    losartan 25 MG Oral Tab Take 1 tablet (25 mg total) by mouth daily. 90 tablet 3    SOLIFENACIN SUCCINATE 5 MG Oral Tab TAKE 1 TABLET DAILY 90 tablet 3    atorvastatin 10 MG Oral Tab Take 1 tablet (10 mg total) by mouth daily. 90 tablet 3    Multiple Vitamins-Minerals (EYE VITAMINS OR) Take 1 tablet by mouth daily.      Famotidine (PEPCID AC OR) Take by mouth daily as needed.      Triamcinolone Acetonide (NASACORT ALLERGY 24HR NA) by Nasal route daily.        cetirizine (ZYRTEC) 10 MG Oral Tab Take 1 tablet (10 mg total) by mouth daily.      Cholecalciferol (VITAMIN D) 2000 UNIT Oral Cap 1 CAPSULE DAILY      Calcium Carbonate (CALCIUM 600 OR) qd        Past Medical History:   Diagnosis Date    ALLERGIC RHINITIS     Asthma     Essential (primary) hypertension 3/18/2024    Frequent urination     High cholesterol     Migraines     very seldom    Reflux       Social History:  Social History     Socioeconomic History    Marital  status:     Number of children: 1   Occupational History    Occupation: volunteer     Comment: school   Tobacco Use    Smoking status: Never    Smokeless tobacco: Never   Vaping Use    Vaping Use: Never used   Substance and Sexual Activity    Alcohol use: No     Alcohol/week: 0.0 standard drinks of alcohol    Drug use: No    Sexual activity: Yes     Partners: Male   Other Topics Concern    Caffeine Concern Yes     Comment: 1 cup of coffee in the mornings    Exercise Yes     Family History   Problem Relation Age of Onset    Heart Disease Father     Heart Attack Father     Cancer Mother         bladder    Heart Attack Mother     Other (emphysema) Mother     Other (osteoporosis) Mother     Diabetes Brother     High Blood Pressure Sister         Allergies  Allergies   Allergen Reactions    Mold          REVIEW OF SYSTEMS:   GENERAL HEALTH:  no fevers   RESPIRATORY: no cough  CARDIOVASCULAR: denies chest pain  GI: denies nausea/vomiting  : + dysuria      EXAM:   /74   Pulse 83   Temp 96.8 °F (36 °C) (Temporal)   Resp 18   Ht 5' 1\" (1.549 m)   Wt 132 lb 12.8 oz (60.2 kg)   LMP 08/08/2015   SpO2 98%   BMI 25.09 kg/m²   GENERAL: well developed, NAD  LUNGS: normal rate without respiratory distress, lungs clear to auscultation  CARDIO: RRR nl S1 S2  GI: normal bowel sounds, soft, suprapubic TTP  EXTREMITIES: no cyanosis, clubbing or edema  NEURO: Alert and oriented    ASSESSMENT AND PLAN:     Encounter Diagnosis   Name     Dysuria, presumed UTI- urine dip +PARESH and blood, will check urine culture and start macrobid 100mg BID for 5 days. Push water.        Orders Placed This Encounter   Procedures    Urine Dip, auto without Micro    Urine Culture, Routine [E]       Meds & Refills for this Visit:  Requested Prescriptions     Signed Prescriptions Disp Refills    nitrofurantoin monohydrate macro 100 MG Oral Cap 10 capsule 0     Sig: Take 1 capsule (100 mg total) by mouth 2 (two) times daily.       Imaging &  Consults:  None    Return if symptoms worsen or fail to improve.  There are no Patient Instructions on file for this visit.      The patient indicates understanding of these issues and agrees to the plan.

## 2024-05-28 ENCOUNTER — NURSE TRIAGE (OUTPATIENT)
Dept: INTERNAL MEDICINE CLINIC | Facility: CLINIC | Age: 64
End: 2024-05-28

## 2024-05-28 NOTE — TELEPHONE ENCOUNTER
Reason for Disposition   Pain radiates into the thigh or further down the leg    Protocols used: Back Pain-A-[place]      Action Requested: Summary for Provider     []  Critical Lab, Recommendations Needed  [] Need Additional Advice  []   FYI    []   Need Orders  [] Need Medications Sent to Pharmacy  []  Other     SUMMARY: Pt called stating she has been having ongoing back pain for 3 weeks and wants to be evaluated. Stated she believes it was from lifting a heavy object. Stated the pain is about 3-4/10 and will sometimes radiate down her leg. Denies numbness/tingling. Stated she is going out of town next week and wants to be evaluated before then. Has been using Aleve PRN and a heating pad. Scheduled OV with CS. Advised to cont with Aleve PRN and heating pad. Pt verbalizes understanding and is agreeable to plan. Appreciative of apt.     Reason for call: Back Pain  Onset: Data Unavailable    Future Appointments   Date Time Provider Department Center   5/30/2024  2:40 PM Rosamaria Platt PA-C [facility]   10/3/2024  8:20 AM HADLEY CASAS RM[facility] [address] [facility]

## 2024-05-30 ENCOUNTER — OFFICE VISIT (OUTPATIENT)
Dept: INTERNAL MEDICINE CLINIC | Facility: CLINIC | Age: 64
End: 2024-05-30

## 2024-05-30 VITALS
BODY MASS INDEX: 25 KG/M2 | DIASTOLIC BLOOD PRESSURE: 76 MMHG | SYSTOLIC BLOOD PRESSURE: 124 MMHG | WEIGHT: 132.19 LBS | OXYGEN SATURATION: 98 % | HEART RATE: 87 BPM | TEMPERATURE: 97 F

## 2024-05-30 DIAGNOSIS — J45.20 MILD INTERMITTENT ASTHMA WITHOUT COMPLICATION (HCC): ICD-10-CM

## 2024-05-30 DIAGNOSIS — M54.50 ACUTE LEFT-SIDED LOW BACK PAIN WITHOUT SCIATICA: Primary | ICD-10-CM

## 2024-05-30 PROCEDURE — 3074F SYST BP LT 130 MM HG: CPT | Performed by: PHYSICIAN ASSISTANT

## 2024-05-30 PROCEDURE — 99214 OFFICE O/P EST MOD 30 MIN: CPT | Performed by: PHYSICIAN ASSISTANT

## 2024-05-30 PROCEDURE — 3078F DIAST BP <80 MM HG: CPT | Performed by: PHYSICIAN ASSISTANT

## 2024-05-30 RX ORDER — CYCLOBENZAPRINE HCL 10 MG
10 TABLET ORAL NIGHTLY PRN
Qty: 20 TABLET | Refills: 0 | Status: SHIPPED | OUTPATIENT
Start: 2024-05-30

## 2024-05-30 NOTE — PROGRESS NOTES
Ashanti Lion is a 63 year old female.   Chief Complaint   Patient presents with    Low Back Pain     Low back back towards L side   Ongoing on for 4 weeks   Pain began after pt performed lifting boxes at work      HPI:    Pt presents with low back pain x 4 weeks.   Began after lifting some heavy chairs.   Applied ice and tried ibuprofen with some relief.   Then started using heat and this helps temporarily.   Pain is more on the left. She initially had pain that radiated into left leg but this has resolved.   Denies numbness, tingling, and weakness.   Denies bowel/bladder incontinence/habit changes.   Pain seems to be slowly improving.   She notes she will be moving to [place] and has been packing boxes. She is also a  so lots of bending, twisting, sitting on the floor, etc.     Allergies:  Allergies   Allergen Reactions    Mold       Current Meds:  Current Outpatient Medications   Medication Sig Dispense Refill    cyclobenzaprine 10 MG Oral Tab Take 1 tablet (10 mg total) by mouth nightly as needed for Muscle spasms. 20 tablet 0    nitrofurantoin monohydrate macro 100 MG Oral Cap Take 1 capsule (100 mg total) by mouth 2 (two) times daily. 10 capsule 0    albuterol (PROAIR HFA) 108 (90 Base) MCG/ACT Inhalation Aero Soln Inhale 2 puffs into the lungs every 6 (six) hours as needed for Wheezing. 3 each 0    losartan 25 MG Oral Tab Take 1 tablet (25 mg total) by mouth daily. 90 tablet 3    SOLIFENACIN SUCCINATE 5 MG Oral Tab TAKE 1 TABLET DAILY 90 tablet 3    atorvastatin 10 MG Oral Tab Take 1 tablet (10 mg total) by mouth daily. 90 tablet 3    Multiple Vitamins-Minerals (EYE VITAMINS OR) Take 1 tablet by mouth daily.      Famotidine (PEPCID AC OR) Take by mouth daily as needed.      Triamcinolone Acetonide (NASACORT ALLERGY 24HR NA) by Nasal route daily.        cetirizine (ZYRTEC) 10 MG Oral Tab Take 1 tablet (10 mg total) by mouth daily.      Cholecalciferol (VITAMIN D) 2000 UNIT Oral Cap 1 CAPSULE  DAILY      Calcium Carbonate (CALCIUM 600 OR) qd          PMH:     Past Medical History:    ALLERGIC RHINITIS    Asthma    Essential (primary) hypertension    Frequent urination    High cholesterol    Migraines    very seldom    Reflux       ROS:   GENERAL: Negative for fever, chills and fatigue. NAD.  HENT: Negative for congestion, sore throat, and ear pain.  RESPIRATORY: Negative for cough, chest tightness, shortness of breath and wheezing.    CV: Negative for chest pain, palpitations and leg swelling.   GI: Negative for nausea, vomiting, abdominal pain, diarrhea, and blood in stool.   : Negative for dysuria, hematuria and difficulty urinating.   MUSCULOSKELETAL: Negative for myalgias, joint swelling, arthralgias and gait problem. +low back pain   NEURO: Negative for dizziness, syncope, weakness, numbness, tingling and headaches.   PSYCH: The patient is not nervous/anxious. No depression.      PHYSICAL EXAM:    /76 (BP Location: Right arm, Patient Position: Sitting, Cuff Size: adult)   Pulse 87   Temp 97 °F (36.1 °C) (Temporal)   Wt 132 lb 3.2 oz (60 kg)   LMP 08/08/2015   SpO2 98%   BMI 24.98 kg/m²     GENERAL: NAD. A&Ox3  RESPIRATORY: CTAB, no R/R/W  CV: RRR, no murmurs.   BACK: left lumbar paraspinal muscle tenderness, no CVA tenderness  LE: strength 5/5 bilaterally  PSYCH: Appropriate mood and affect.      ASSESSMENT/ PLAN:   1. Acute left-sided low back pain without sciatica  Seems muscular   Reviewed gentle stretches  Avoid heavy lifting but also recommend avoiding bed rest  Aleve bid prn   Flexeril nightly prn - pt aware that this may cause drowsiness and no taking while driving/working   Continue heat prn     2. Mild intermittent asthma without complication (HCC)  Stable with albuterol prn   ACT 25   AAP updated and given        Health Maintenance Due   Topic Date Due    Asthma Control Test  01/16/2020    Asthma Action Plan  03/13/2024           Pt indicates understanding and agrees to the  plan.     Return if symptoms worsen or fail to improve.    MEET NuñezC       Kamini Jackson - KAROLINAP

## 2024-08-01 RX ORDER — ATORVASTATIN CALCIUM 10 MG/1
10 TABLET, FILM COATED ORAL DAILY
Qty: 90 TABLET | Refills: 3 | Status: SHIPPED | OUTPATIENT
Start: 2024-08-01

## 2024-08-01 NOTE — TELEPHONE ENCOUNTER
Refill passed per [facility] protocol.  Requested Prescriptions   Pending Prescriptions Disp Refills    ATORVASTATIN 10 MG Oral Tab [Pharmacy Med Name: ATORVASTATIN TABS 10MG] 90 tablet 3     Sig: TAKE 1 TABLET DAILY       Cholesterol Medication Protocol Passed - 7/29/2024 12:52 AM        Passed - ALT < 80     Lab Results   Component Value Date    ALT 22 03/11/2024             Passed - ALT resulted within past year        Passed - Lipid panel within past 12 months     Lab Results   Component Value Date    CHOLEST 177 03/11/2024    TRIG 66 03/11/2024    HDL 60 (H) 03/11/2024     (H) 03/11/2024    VLDL 11 03/11/2024    TCHDLRATIO 3.37 01/11/2017    NONHDLC 117 03/11/2024             Passed - In person appointment or virtual visit in the past 12 mos or appointment in next 3 mos     Recent Outpatient Visits              2 months ago Acute left-sided low back pain without sciatica    [facility], [address] [place] [address] Rosamaria Platt PA-C    Office Visit    4 months ago Dysuria    [facility], [address] [place] [address] Ana María Shaw MD    Office Visit    4 months ago Routine general medical examination at a health care facility    [facility], [address] [place] [address] Ana María Shaw MD    Office Visit    6 months ago Flu-like symptoms    [facility], [facility], [address] [place], [place] [address]Matilda Stafford APRN    Office Visit    7 months ago Acute non-recurrent sinusitis, unspecified location    [facility], [address] [place] [address] Eduin Mcgregor MD    Office Visit          Future Appointments         Provider Department Appt Notes    In 2 months [facility] [address] Mamm[address] - [address] Order in Epic from Dr. Henry 03/18/2024 last [facility] 10/2/2023 clg.                       Future Appointments         Provider Department Appt Notes    In 2 months [address] Mammography - [address]  Order in Epic from Dr. Henry 03/18/2024 last [facility] 10/2/2023 clg.          Recent Outpatient Visits              2 months ago Acute left-sided low back pain without sciatica    [facility], [address] [place] [address], [place] PA-C    Office Visit    4 months ago Dysuria    [facility], [address] [place], [place] [address]Ana María Alvarado MD    Office Visit    4 months ago Routine general medical examination at a health care facility    [facility], [address] [place] [address] Ana María Shaw MD    Office Visit    6 months ago Flu-like symptoms    [facility], [facility], [address] [place], [place] [address]Matilda Stafford APRN    Office Visit    7 months ago Acute non-recurrent sinusitis, unspecified location    [facility], [address] [place] [address] Eduin Vicente MD    Office Visit

## 2024-08-03 RX ORDER — LOSARTAN POTASSIUM 25 MG/1
25 TABLET ORAL DAILY
Qty: 90 TABLET | Refills: 3 | Status: SHIPPED | OUTPATIENT
Start: 2024-08-03

## 2024-08-03 NOTE — TELEPHONE ENCOUNTER
Refill passed per [facility] protocol.     Requested Prescriptions   Pending Prescriptions Disp Refills    LOSARTAN 25 MG Oral Tab [Pharmacy Med Name: LOSARTAN TABS 25MG] 90 tablet 3     Sig: TAKE 1 TABLET DAILY       Hypertension Medications Protocol Passed - 7/31/2024 12:13 AM        Passed - CMP or BMP in past 12 months        Passed - Last BP reading less than 140/90     BP Readings from Last 1 Encounters:   05/30/24 124/76               Passed - In person appointment or virtual visit in the past 12 mos or appointment in next 3 mos     Recent Outpatient Visits              2 months ago Acute left-sided low back pain without sciatica    [facility], [address] [place] [address], [place]    Office Visit    4 months ago Dysuria    [address] Ana María Shaw MD    Office Visit    4 months ago Routine general medical examination at a health care facility    [facility], [address] [place] [address] Ana María Shaw MD    Office Visit    6 months ago Flu-like symptoms    [facility], [facility]In [facility], [address] [place], [place] [address]Matilda Stafford APRN    Office Visit    7 months ago Acute non-recurrent sinusitis, unspecified location    [facility], [address] [place], [place] [address]Eduin Reid MD    Office Visit          Future Appointments         Provider Department Appt Notes    In 2 months [address] Mammography - [address] Order in Epic from Dr. Henry 03/18/2024 last [facility] 10/2/2023 clg.                    Passed - EGFRCR or GFRNAA > 50     GFR Evaluation  EGFRCR: 80 , resulted on 3/11/2024

## 2024-10-03 ENCOUNTER — HOSPITAL ENCOUNTER (OUTPATIENT)
Dept: MAMMOGRAPHY | Age: 64
Discharge: HOME | End: 2024-10-03
Attending: INTERNAL MEDICINE
Payer: COMMERCIAL

## 2024-10-03 DIAGNOSIS — Z12.31 ENCOUNTER FOR SCREENING MAMMOGRAM FOR MALIGNANT NEOPLASM OF BREAST: ICD-10-CM

## 2024-10-03 PROCEDURE — 77067 SCR MAMMO BI INCL CAD: CPT | Performed by: INTERNAL MEDICINE

## 2024-10-03 PROCEDURE — 77063 BREAST TOMOSYNTHESIS BI: CPT | Performed by: INTERNAL MEDICINE

## 2025-03-04 RX ORDER — SOLIFENACIN SUCCINATE 5 MG/1
5 TABLET, FILM COATED ORAL DAILY
Qty: 90 TABLET | Refills: 3 | Status: SHIPPED | OUTPATIENT
Start: 2025-03-04

## 2025-03-04 NOTE — TELEPHONE ENCOUNTER
Refill Per Protocol     Requested Prescriptions   Pending Prescriptions Disp Refills    SOLIFENACIN SUCCINATE 5 MG Oral Tab [Pharmacy Med Name: SOLIFENACIN SUCCINATE TABS 5MG] 90 tablet 3     Sig: TAKE 1 TABLET DAILY       Genitourinary Medications Passed - 3/4/2025 10:01 AM        Passed - Patient does not have pulmonary hypertension on problem list        Passed - In person appointment or virtual visit in the past 12 mos or appointment in next 3 mos     Recent Outpatient Visits              9 months ago Acute left-sided low back pain without sciatica    [facility], [address] [place] [address], Rosamaria Maldonado PA-C    Office Visit    11 months ago Dysuria    [facility], [address] [place] [address] Ana María Shaw MD    Office Visit    11 months ago Routine general medical examination at a health care facility    [facility], [address] [place] [address], Ana María Shaw MD    Office Visit    1 year ago Flu-like symptoms    [facility], [facility], [address] [place], [place] [address]Matilda Stafford APRN    Office Visit    1 year ago Acute non-recurrent sinusitis, unspecified location    [facility], [address] [place], [place] [address]Eduin Pierce MD    Office Visit                      Passed - Medication is active on med list

## (undated) NOTE — LETTER
ASTHMA ACTION PLAN for Roro Mullins     : 1960     Date: 3/13/2023  Provider:  Magaly Eisenberg PA-C  Phone for doctor or clinic: [facility], [place], [address]  [facility] [id number] [phone]           You can use the colors of a traffic light to help learn about your asthma medicines. 1. Green - Go! % of Personal [employer] Peak Flow Use controller medicine. Breathing is good  No cough or wheeze  Can work and play Medicine How much to take When to take it    No regular controller medication       2. Yellow - Caution. 50-79% Personal [employer] Peak  Flow. Use reliever medicine to keep an asthma attack from getting bad. Cough  Wheezing  Tight Chest  Wake up at night Medicine How much to take When to take it    Albuterol inhaler - 2 puffs every 4-6 hours as needed  Call PCP       Additional instructions         3. Red - Stop! Danger!  <50% Personal [employer] Peak  Flow. Take these medications until  Get help from a doctor   Medicine not helping  Breathing is hard and fast  Nose opens wide  Can't walk  Ribs show  Can't talk well Medicine How much to take When to take it    Call 911/go to the ER     Additional Instructions If your symptoms do not improve and you cannot contact your doctor, [facility] or call 911 immediately! [x] Asthma Action Plan reviewed with patient (and caregiver if necessary) and a copy of the plan was given to the patient/caregiver. [] Asthma Action Plan reviewed with patient (and caregiver if necessary) on the phone and mailed copy to patient or submitted via [address]      Signatures:  Provider  Magaly Eisenberg PA-C   Patient Caretaker

## (undated) NOTE — LETTER
ASTHMA ACTION PLAN for Ashanti Lion     : 1960     Date: 3/18/2024  Provider:  Ana María Umanzor MD  Phone for doctor or clinic: [facility], [address] [place] [id number] [phone]           You can use the colors of a traffic light to help learn about your asthma medicines.      1. Green - Go! % of Personal Best Peak Flow Use controller medicine.   Breathing is good  No cough or wheeze  Can work and play Medicine How much to take When to take it    You do not take something daily for this.      2. Yellow - Caution. 50-79% Personal Best Peak  Flow.  Use reliever medicine to keep an asthma attack from getting bad.   Cough  Wheezing  Tight Chest  Wake up at night Medicine How much to take When to take it    Albuterol inhaler,  2 puffs every six hours as needed.         Additional instructions         3. Red - Stop! Danger!  <50% Personal [employer] Peak  Flow. Take these medications until  Get help from a doctor   Medicine not helping  Breathing is hard and fast  Nose opens wide  Can't walk  Ribs show  Can't talk well Medicine How much to take When to take it    Take two puffs now.  Call 911  Or go to nearest ER     Additional Instructions If your symptoms do not improve and you cannot contact your doctor, [facility] or call 911 immediately!     [x] Asthma Action Plan reviewed with patient (and caregiver if necessary) and a copy of the plan was given to the patient/caregiver.   [] Asthma Action Plan reviewed with patient (and caregiver if necessary) on the phone and mailed copy to patient or submitted via [employer].     Signatures:  Provider  Ana María Umanzor MD   Patient Caretaker

## (undated) NOTE — LETTER
ASTHMA ACTION PLAN for Ashanti Lion     : 1960     Date: 2024  Provider:  Rosamaria Platt PA-C  Phone for doctor or clinic: [facility], [address] [place] [id number] [phone]    ACT Score: 25      You can use the colors of a traffic light to help learn about your asthma medicines.      1. Green - Go! % of Personal Best Peak Flow Use controller medicine.   Breathing is good  No cough or wheeze  Can work and play Medicine How much to take When to take it    No regular controller medication       2. Yellow - Caution. 50-79% Personal Best Peak  Flow.  Use reliever medicine to keep an asthma attack from getting bad.   Cough  Wheezing  Tight Chest  Wake up at night Medicine How much to take When to take it    Albuterol - 2 puffs every 4 hours as needed  Call PCP       Additional instructions         3. Red - Stop! Danger!  <50% Personal [employer] Peak  Flow. Take these medications until  Get help from a doctor   Medicine not helping  Breathing is hard and fast  Nose opens wide  Can't walk  Ribs show  Can't talk well Medicine How much to take When to take it    Go to the nearest Emergency Room/Department right now!       Additional Instructions If your symptoms do not improve and you cannot contact your doctor, [facility] or call 911 immediately!     [x] Asthma Action Plan reviewed with patient (and caregiver if necessary) and a copy of the plan was given to the patient/caregiver.   [] Asthma Action Plan reviewed with patient (and caregiver if necessary) on the phone and mailed copy to patient or submitted via [employer].     Signatures:  Provider  Rosamaria Platt PA-C   Patient Caretaker